# Patient Record
Sex: MALE | Race: WHITE | NOT HISPANIC OR LATINO
[De-identification: names, ages, dates, MRNs, and addresses within clinical notes are randomized per-mention and may not be internally consistent; named-entity substitution may affect disease eponyms.]

---

## 2017-07-04 ENCOUNTER — TRANSCRIPTION ENCOUNTER (OUTPATIENT)
Age: 62
End: 2017-07-04

## 2018-03-18 ENCOUNTER — TRANSCRIPTION ENCOUNTER (OUTPATIENT)
Age: 63
End: 2018-03-18

## 2020-03-02 ENCOUNTER — TRANSCRIPTION ENCOUNTER (OUTPATIENT)
Age: 65
End: 2020-03-02

## 2021-09-15 PROBLEM — Z00.00 ENCOUNTER FOR PREVENTIVE HEALTH EXAMINATION: Status: ACTIVE | Noted: 2021-09-15

## 2021-10-04 ENCOUNTER — NON-APPOINTMENT (OUTPATIENT)
Age: 66
End: 2021-10-04

## 2021-10-04 ENCOUNTER — APPOINTMENT (OUTPATIENT)
Dept: CARDIOLOGY | Facility: CLINIC | Age: 66
End: 2021-10-04
Payer: COMMERCIAL

## 2021-10-04 VITALS — SYSTOLIC BLOOD PRESSURE: 118 MMHG | DIASTOLIC BLOOD PRESSURE: 68 MMHG

## 2021-10-04 VITALS
TEMPERATURE: 98.8 F | HEART RATE: 53 BPM | DIASTOLIC BLOOD PRESSURE: 66 MMHG | WEIGHT: 255 LBS | RESPIRATION RATE: 16 BRPM | BODY MASS INDEX: 40.02 KG/M2 | OXYGEN SATURATION: 98 % | SYSTOLIC BLOOD PRESSURE: 128 MMHG | HEIGHT: 67 IN

## 2021-10-04 DIAGNOSIS — G47.33 OBSTRUCTIVE SLEEP APNEA (ADULT) (PEDIATRIC): ICD-10-CM

## 2021-10-04 DIAGNOSIS — M51.26 OTHER INTERVERTEBRAL DISC DISPLACEMENT, LUMBAR REGION: ICD-10-CM

## 2021-10-04 DIAGNOSIS — K25.9 GASTRIC ULCER, UNSPECIFIED AS ACUTE OR CHRONIC, W/OUT HEMORRHAGE OR PERFORATION: ICD-10-CM

## 2021-10-04 DIAGNOSIS — Z82.49 FAMILY HISTORY OF ISCHEMIC HEART DISEASE AND OTHER DISEASES OF THE CIRCULATORY SYSTEM: ICD-10-CM

## 2021-10-04 DIAGNOSIS — I24.8 OTHER FORMS OF ACUTE ISCHEMIC HEART DISEASE: ICD-10-CM

## 2021-10-04 DIAGNOSIS — M19.90 UNSPECIFIED OSTEOARTHRITIS, UNSPECIFIED SITE: ICD-10-CM

## 2021-10-04 DIAGNOSIS — I10 ESSENTIAL (PRIMARY) HYPERTENSION: ICD-10-CM

## 2021-10-04 DIAGNOSIS — Z83.438 FAMILY HISTORY OF OTHER DISORDER OF LIPOPROTEIN METABOLISM AND OTHER LIPIDEMIA: ICD-10-CM

## 2021-10-04 DIAGNOSIS — I25.10 ATHEROSCLEROTIC HEART DISEASE OF NATIVE CORONARY ARTERY W/OUT ANGINA PECTORIS: ICD-10-CM

## 2021-10-04 DIAGNOSIS — Z78.9 OTHER SPECIFIED HEALTH STATUS: ICD-10-CM

## 2021-10-04 DIAGNOSIS — E78.5 HYPERLIPIDEMIA, UNSPECIFIED: ICD-10-CM

## 2021-10-04 DIAGNOSIS — Z86.2 PERSONAL HISTORY OF DISEASES OF THE BLOOD AND BLOOD-FORMING ORGANS AND CERTAIN DISORDERS INVOLVING THE IMMUNE MECHANISM: ICD-10-CM

## 2021-10-04 DIAGNOSIS — E66.9 OBESITY, UNSPECIFIED: ICD-10-CM

## 2021-10-04 PROCEDURE — 93000 ELECTROCARDIOGRAM COMPLETE: CPT

## 2021-10-04 PROCEDURE — 99204 OFFICE O/P NEW MOD 45 MIN: CPT

## 2021-10-04 RX ORDER — ROSUVASTATIN CALCIUM 10 MG/1
10 TABLET, FILM COATED ORAL DAILY
Qty: 90 | Refills: 3 | Status: ACTIVE | COMMUNITY
Start: 2021-08-11

## 2021-10-04 RX ORDER — AMOXICILLIN 500 MG/1
500 CAPSULE ORAL
Qty: 16 | Refills: 0 | Status: DISCONTINUED | COMMUNITY
Start: 2021-06-21

## 2021-10-04 RX ORDER — DULOXETINE HYDROCHLORIDE 60 MG/1
60 CAPSULE, DELAYED RELEASE PELLETS ORAL
Qty: 90 | Refills: 3 | Status: ACTIVE | COMMUNITY
Start: 2021-05-20

## 2021-10-04 RX ORDER — HYDROCORTISONE 25 MG/G
2.5 CREAM TOPICAL
Qty: 85 | Refills: 0 | Status: ACTIVE | COMMUNITY
Start: 2021-09-25

## 2021-10-04 RX ORDER — MORPHINE SULFATE 10 MG/1
10 CAPSULE, EXTENDED RELEASE ORAL
Qty: 30 | Refills: 0 | Status: ACTIVE | COMMUNITY
Start: 2021-09-27

## 2021-10-04 RX ORDER — ACETIC ACID 20 MG/ML
2 SOLUTION AURICULAR (OTIC)
Qty: 15 | Refills: 0 | Status: ACTIVE | COMMUNITY
Start: 2021-09-03

## 2021-10-04 RX ORDER — KETOCONAZOLE 20 MG/G
2 CREAM TOPICAL
Qty: 90 | Refills: 0 | Status: ACTIVE | COMMUNITY
Start: 2021-09-25

## 2021-10-04 RX ORDER — GABAPENTIN 600 MG/1
600 TABLET, FILM COATED ORAL DAILY
Refills: 0 | Status: ACTIVE | COMMUNITY
Start: 2021-04-22

## 2021-10-04 RX ORDER — AMLODIPINE BESYLATE 10 MG/1
10 TABLET ORAL
Qty: 90 | Refills: 1 | Status: ACTIVE | COMMUNITY
Start: 2021-04-13

## 2021-10-04 RX ORDER — DOXYCYCLINE HYCLATE 100 MG/1
100 TABLET ORAL
Qty: 42 | Refills: 0 | Status: DISCONTINUED | COMMUNITY
Start: 2021-07-30

## 2021-10-04 RX ORDER — TIZANIDINE 4 MG/1
4 TABLET ORAL 3 TIMES DAILY
Refills: 0 | Status: ACTIVE | COMMUNITY
Start: 2021-08-07

## 2021-10-04 RX ORDER — TAMSULOSIN HYDROCHLORIDE 0.4 MG/1
0.4 CAPSULE ORAL
Qty: 90 | Refills: 0 | Status: ACTIVE | COMMUNITY
Start: 2021-05-19

## 2021-10-04 RX ORDER — MORPHINE SULFATE 15 MG/1
15 TABLET, FILM COATED, EXTENDED RELEASE ORAL
Qty: 30 | Refills: 0 | Status: DISCONTINUED | COMMUNITY
Start: 2021-04-29

## 2021-10-04 RX ORDER — CHLORHEXIDINE GLUCONATE 4 %
5 LIQUID (ML) TOPICAL
Refills: 0 | Status: ACTIVE | COMMUNITY

## 2021-10-04 RX ORDER — AZELASTINE HYDROCHLORIDE 0.5 MG/ML
0.05 SOLUTION/ DROPS OPHTHALMIC
Qty: 12 | Refills: 0 | Status: ACTIVE | COMMUNITY
Start: 2021-04-11

## 2021-10-04 RX ORDER — PANTOPRAZOLE 40 MG/1
40 TABLET, DELAYED RELEASE ORAL
Qty: 90 | Refills: 0 | Status: ACTIVE | COMMUNITY
Start: 2021-09-16

## 2021-10-04 RX ORDER — DOXYCYCLINE HYCLATE 100 MG/1
100 CAPSULE ORAL
Qty: 16 | Refills: 0 | Status: DISCONTINUED | COMMUNITY
Start: 2021-05-25

## 2021-10-04 RX ORDER — FINASTERIDE 5 MG/1
5 TABLET, FILM COATED ORAL
Qty: 90 | Refills: 3 | Status: ACTIVE | COMMUNITY
Start: 2021-04-27

## 2021-10-04 RX ORDER — LOSARTAN POTASSIUM AND HYDROCHLOROTHIAZIDE 25; 100 MG/1; MG/1
100-25 TABLET ORAL
Qty: 90 | Refills: 3 | Status: ACTIVE | COMMUNITY
Start: 2021-04-11

## 2021-10-04 RX ORDER — HALOBETASOL PROPIONATE 0.5 MG/G
0.05 CREAM TOPICAL
Qty: 50 | Refills: 0 | Status: DISCONTINUED | COMMUNITY
Start: 2021-05-17

## 2021-10-04 RX ORDER — ATENOLOL 25 MG/1
25 TABLET ORAL DAILY
Qty: 90 | Refills: 3 | Status: ACTIVE | COMMUNITY
Start: 2021-08-11

## 2021-10-04 RX ORDER — CLOPIDOGREL BISULFATE 75 MG/1
75 TABLET, FILM COATED ORAL
Qty: 90 | Refills: 1 | Status: ACTIVE | COMMUNITY
Start: 2021-09-16

## 2021-10-04 RX ORDER — VITAMIN B COMPLEX
CAPSULE ORAL
Refills: 0 | Status: ACTIVE | COMMUNITY

## 2021-10-04 RX ORDER — TRIAMCINOLONE ACETONIDE 1 MG/G
0.1 CREAM TOPICAL
Qty: 90 | Refills: 0 | Status: DISCONTINUED | COMMUNITY
Start: 2021-05-25

## 2021-10-04 NOTE — PHYSICAL EXAM

## 2021-10-04 NOTE — REASON FOR VISIT
[FreeTextEntry1] : Prosper is a pleasant 66-year-old male with history of hypertension, obesity, lumbar disc herniation, osteoarthritis of both knees, hypercholesteremia\par \par He was at [facility] on 8/27/2021 with worsening chest pain, anemia, status post 1 unit PRBC, EGD showing antrum ulcer.  EKG had ST depression with elevated troponin.  Subsequently, he had echocardiogram that showed normal EF but segmental wall motion abnormality in the inferior region and CTA of coronaries that showed 70% stenosis of circumflex and RCA.\par \par He was recommended cardiac cath and he is here for second opinion\par \katherine Currently takes Plavix.  Aspirin was discontinued due to antral ulcer.  He is on PPI.  Repeat endoscopy is scheduled for November.  At this time, he does not have melena or GI bleed.  He is able to ambulate and do routine activities without chest pain or shortness of breath. His  Activities are mainly limited by backache and knee pains.\par \par We will schedule cardiac cath after EGD ASAP.  Patient and wife understand.  We had extensive discussion regarding risk and benefits\par \par Thank you for this referral and allowing me to participate in the care of this patient.  If I can be of any further help or  if you have any questions, please do not hesitate to contact me\par \par \par Sincerely,\par \par Lalito Shelby MD, FAC, NAYA

## 2021-10-04 NOTE — DISCUSSION/SUMMARY
[FreeTextEntry1] : CAD: Patient has 70% stenosis of circumflex and RCA.  He presented to [facility] with non-STEMI.  Perhaps, associated significant anemia contributed to his chest discomfort and ST changes.  Currently, he is able to do routine activities (hemo-globin has improved to greater than 11, he is on beta-blockers with well-controlled blood pressure).  If he has any recurrence of chest discomfort symptoms, he should call 911 and go to the ER stat.  He will probably need PCI ASAP if that happened.\par \par Gastric ulcer.  He had GI bleed with anemia.  He had EGD.  PPI were prescribed.  Follow-up EGD has been scheduled in November.  I have asked the patient to take iron supplements.  His iron saturation was 16% with low iron levels.  I have asked him to see if GI could perform endoscopy as early as possible  to facilitate earlier cardiac cath and PCI which will require dual antiplatelet treatment.  He understands the risk associated with that approach\par \par Hypertension: Continue current medications.  Blood pressure well controlled.  Increase beta-blockade if needed.  Currently very well beta blocked by heart rate\par \par Obesity: Dietary changes to reduce weight was discussed.  Patient understands this.  It will also help blood pressure as well as lipids.\par \par Hyperlipidemia: patient already on statins.  Target  LDL discussed.  Statin therapy dose may have to be increased.\par \par Thank you for this referral and allowing me to participate in the care of this patient.  If I can be of any further help or  if you have any questions, please do not hesitate to contact me\par \par \par Sincerely,\par \par Lalito Shelby MD, FACC, NAYA\par \par

## 2021-10-04 NOTE — REVIEW OF SYSTEMS
[Joint Pain] : joint pain [Negative] : Heme/Lymph [FreeTextEntry5] : Hx of chest pain [FreeTextEntry9] : Backache

## 2021-10-27 ENCOUNTER — OUTPATIENT (OUTPATIENT)
Dept: OUTPATIENT SERVICES | Facility: HOSPITAL | Age: 66
LOS: 1 days | End: 2021-10-27

## 2022-05-06 ENCOUNTER — APPOINTMENT (OUTPATIENT)
Dept: ORTHOPEDIC SURGERY | Facility: CLINIC | Age: 67
End: 2022-05-06
Payer: COMMERCIAL

## 2022-05-06 VITALS — BODY MASS INDEX: 40.81 KG/M2 | HEIGHT: 67 IN | WEIGHT: 260 LBS

## 2022-05-06 DIAGNOSIS — M67.40 GANGLION, UNSPECIFIED SITE: ICD-10-CM

## 2022-05-06 PROCEDURE — 99203 OFFICE O/P NEW LOW 30 MIN: CPT

## 2022-05-06 NOTE — ASSESSMENT
[FreeTextEntry1] : Left wrist volar ganglion - reviewed pathoanatomy with patient. Discussed management will consist of observation, bracing prn, aspiration not option in light of proximity of radial artery and operative excision. Would pursue MRI if he wanted to proceed with excision.\par \par F/u prn

## 2022-05-06 NOTE — IMAGING
[de-identified] : LEFT HAND EXAM\par \par 2-3cm volar ganglion, firm, tender, not pulsatile\par Skin intact\par No deformity, edema, ecchymosis\par Warm and well perfused\par Brisk capillary refill throughout\par Motor function intact AIN, PIN, and ulnar nerves\par Sensation intact to light touch in median, ulnar, and radial nerves\par Strength with , finger abduction, wrist flexion, wrist extension 5/5\par Finger and wrist motion is intact and full\par Patient is able to make a composite fist\par

## 2022-05-06 NOTE — HISTORY OF PRESENT ILLNESS
[de-identified] : 66M, PHMX of heart disease,\par \par Growth in left wrist. 5 months with lump. States it recently started growing each month. Admits to arthritis.  Admits to experiencing pain, no numbness/tingling. States having b/l CTR and Cubital tunnel in 12/2019 3/2020. States to not having lump operated on. Dr. Kang (Dermatologist) recommended to f/u with orthopedics. Denies any intervention.

## 2023-11-20 ENCOUNTER — APPOINTMENT (OUTPATIENT)
Dept: ORTHOPEDIC SURGERY | Facility: CLINIC | Age: 68
End: 2023-11-20

## 2023-11-20 DIAGNOSIS — I25.2 OLD MYOCARDIAL INFARCTION: ICD-10-CM

## 2024-06-12 ENCOUNTER — APPOINTMENT (OUTPATIENT)
Dept: VASCULAR SURGERY | Facility: CLINIC | Age: 69
End: 2024-06-12
Payer: COMMERCIAL

## 2024-06-12 VITALS
HEIGHT: 67 IN | BODY MASS INDEX: 40.02 KG/M2 | DIASTOLIC BLOOD PRESSURE: 62 MMHG | WEIGHT: 255 LBS | SYSTOLIC BLOOD PRESSURE: 142 MMHG

## 2024-06-12 DIAGNOSIS — I87.2 VENOUS INSUFFICIENCY (CHRONIC) (PERIPHERAL): ICD-10-CM

## 2024-06-12 DIAGNOSIS — G47.30 SLEEP APNEA, UNSPECIFIED: ICD-10-CM

## 2024-06-12 DIAGNOSIS — F41.9 ANXIETY DISORDER, UNSPECIFIED: ICD-10-CM

## 2024-06-12 DIAGNOSIS — Z85.828 PERSONAL HISTORY OF OTHER MALIGNANT NEOPLASM OF SKIN: ICD-10-CM

## 2024-06-12 DIAGNOSIS — Z80.8 FAMILY HISTORY OF MALIGNANT NEOPLASM OF OTHER ORGANS OR SYSTEMS: ICD-10-CM

## 2024-06-12 DIAGNOSIS — N40.0 BENIGN PROSTATIC HYPERPLASIA WITHOUT LOWER URINARY TRACT SYMPMS: ICD-10-CM

## 2024-06-12 DIAGNOSIS — R60.0 LOCALIZED EDEMA: ICD-10-CM

## 2024-06-12 PROCEDURE — 99203 OFFICE O/P NEW LOW 30 MIN: CPT

## 2024-06-12 NOTE — PHYSICAL EXAM
[JVD] : no jugular venous distention  [Normal Breath Sounds] : Normal breath sounds [Normal Heart Sounds] : normal heart sounds [2+] : right 2+ [Ankle Swelling (On Exam)] : present [Ankle Swelling Bilaterally] : bilaterally  [Ankle Swelling On The Left] : moderate [Varicose Veins Of Lower Extremities] : not present [] : of the left leg [Abdomen Masses] : No abdominal masses [Ankle Swelling On The Right] : mild [Abdomen Tenderness] : ~T ~M No abdominal tenderness [Skin Induration] : no induration [Skin Ulcer] : no ulcer [Calm] : calm [de-identified] : Markedly overweight, no acute distress

## 2024-06-12 NOTE — HISTORY OF PRESENT ILLNESS
[FreeTextEntry1] : 69-year-old male is being referred by Dr. Deras to be evaluated for bilateral leg edema, left leg discoloration.  Symptoms started a few years ago.  Patient has not been wearing compression stockings.

## 2024-06-12 NOTE — REVIEW OF SYSTEMS
[Leg Claudication] : no intermittent leg claudication [Lower Ext Edema] : lower extremity edema [Arthralgias] : arthralgias [Limb Pain] : limb pain [Limb Swelling] : limb swelling [Negative] : Integumentary

## 2024-07-03 ENCOUNTER — APPOINTMENT (OUTPATIENT)
Dept: VASCULAR SURGERY | Facility: CLINIC | Age: 69
End: 2024-07-03
Payer: COMMERCIAL

## 2024-07-03 VITALS
DIASTOLIC BLOOD PRESSURE: 74 MMHG | SYSTOLIC BLOOD PRESSURE: 162 MMHG | HEIGHT: 67 IN | WEIGHT: 255 LBS | BODY MASS INDEX: 40.02 KG/M2

## 2024-07-03 DIAGNOSIS — Z85.828 PERSONAL HISTORY OF OTHER MALIGNANT NEOPLASM OF SKIN: ICD-10-CM

## 2024-07-03 DIAGNOSIS — Z80.8 FAMILY HISTORY OF MALIGNANT NEOPLASM OF OTHER ORGANS OR SYSTEMS: ICD-10-CM

## 2024-07-03 DIAGNOSIS — F41.9 ANXIETY DISORDER, UNSPECIFIED: ICD-10-CM

## 2024-07-03 DIAGNOSIS — N40.0 BENIGN PROSTATIC HYPERPLASIA WITHOUT LOWER URINARY TRACT SYMPMS: ICD-10-CM

## 2024-07-03 PROCEDURE — 99214 OFFICE O/P EST MOD 30 MIN: CPT

## 2024-07-08 PROBLEM — Z85.828 HISTORY OF SQUAMOUS CELL CARCINOMA OF SKIN: Status: RESOLVED | Noted: 2024-06-12 | Resolved: 2024-07-08

## 2024-07-08 PROBLEM — N40.0 ENLARGED PROSTATE: Status: RESOLVED | Noted: 2024-06-12 | Resolved: 2024-07-08

## 2024-07-09 ENCOUNTER — APPOINTMENT (OUTPATIENT)
Dept: VASCULAR SURGERY | Facility: CLINIC | Age: 69
End: 2024-07-09
Payer: COMMERCIAL

## 2024-07-09 DIAGNOSIS — G47.30 SLEEP APNEA, UNSPECIFIED: ICD-10-CM

## 2024-07-09 DIAGNOSIS — Z99.89 DEPENDENCE ON OTHER ENABLING MACHINES AND DEVICES: ICD-10-CM

## 2024-07-09 PROCEDURE — 93970 EXTREMITY STUDY: CPT

## 2024-07-10 ENCOUNTER — APPOINTMENT (OUTPATIENT)
Dept: VASCULAR SURGERY | Facility: CLINIC | Age: 69
End: 2024-07-10
Payer: COMMERCIAL

## 2024-07-10 VITALS
WEIGHT: 255 LBS | DIASTOLIC BLOOD PRESSURE: 60 MMHG | HEIGHT: 67 IN | BODY MASS INDEX: 40.02 KG/M2 | SYSTOLIC BLOOD PRESSURE: 154 MMHG

## 2024-07-10 DIAGNOSIS — I87.2 VENOUS INSUFFICIENCY (CHRONIC) (PERIPHERAL): ICD-10-CM

## 2024-07-10 PROCEDURE — 99213 OFFICE O/P EST LOW 20 MIN: CPT

## 2024-07-12 ENCOUNTER — APPOINTMENT (OUTPATIENT)
Dept: CARDIOLOGY | Facility: CLINIC | Age: 69
End: 2024-07-12
Payer: COMMERCIAL

## 2024-07-12 VITALS
OXYGEN SATURATION: 97 % | HEIGHT: 67 IN | BODY MASS INDEX: 41.59 KG/M2 | WEIGHT: 265 LBS | DIASTOLIC BLOOD PRESSURE: 52 MMHG | RESPIRATION RATE: 16 BRPM | SYSTOLIC BLOOD PRESSURE: 120 MMHG | HEART RATE: 45 BPM

## 2024-07-12 VITALS — DIASTOLIC BLOOD PRESSURE: 56 MMHG | SYSTOLIC BLOOD PRESSURE: 116 MMHG

## 2024-07-12 DIAGNOSIS — G47.33 OBSTRUCTIVE SLEEP APNEA (ADULT) (PEDIATRIC): ICD-10-CM

## 2024-07-12 DIAGNOSIS — I44.7 LEFT BUNDLE-BRANCH BLOCK, UNSPECIFIED: ICD-10-CM

## 2024-07-12 DIAGNOSIS — R07.89 OTHER CHEST PAIN: ICD-10-CM

## 2024-07-12 DIAGNOSIS — R60.0 LOCALIZED EDEMA: ICD-10-CM

## 2024-07-12 DIAGNOSIS — I10 ESSENTIAL (PRIMARY) HYPERTENSION: ICD-10-CM

## 2024-07-12 DIAGNOSIS — E78.5 HYPERLIPIDEMIA, UNSPECIFIED: ICD-10-CM

## 2024-07-12 DIAGNOSIS — E66.9 OBESITY, UNSPECIFIED: ICD-10-CM

## 2024-07-12 DIAGNOSIS — I25.10 ATHEROSCLEROTIC HEART DISEASE OF NATIVE CORONARY ARTERY W/OUT ANGINA PECTORIS: ICD-10-CM

## 2024-07-12 DIAGNOSIS — Z78.9 OTHER SPECIFIED HEALTH STATUS: ICD-10-CM

## 2024-07-12 PROCEDURE — G2211 COMPLEX E/M VISIT ADD ON: CPT | Mod: NC

## 2024-07-12 PROCEDURE — 99214 OFFICE O/P EST MOD 30 MIN: CPT

## 2024-07-12 PROCEDURE — 93000 ELECTROCARDIOGRAM COMPLETE: CPT

## 2024-07-15 PROBLEM — Z99.89 CPAP (CONTINUOUS POSITIVE AIRWAY PRESSURE) DEPENDENCE: Status: ACTIVE | Noted: 2024-07-15

## 2024-07-15 PROBLEM — G47.30 SEVERE SLEEP APNEA: Status: ACTIVE | Noted: 2024-07-15

## 2024-07-15 RX ORDER — NITROGLYCERIN 0.4 MG/1
0.4 TABLET SUBLINGUAL
Refills: 0 | Status: ACTIVE | COMMUNITY

## 2024-07-15 RX ORDER — OMEGA-3/DHA/EPA/FISH OIL 300-1000MG
400 CAPSULE ORAL
Refills: 0 | Status: ACTIVE | COMMUNITY

## 2024-07-15 RX ORDER — ACETAMINOPHEN 500 MG/1
500 TABLET ORAL
Refills: 0 | Status: ACTIVE | COMMUNITY

## 2024-07-15 RX ORDER — HYDROCODONE BITARTRATE AND ACETAMINOPHEN 5; 325 MG/1; MG/1
5-325 TABLET ORAL
Refills: 0 | Status: ACTIVE | COMMUNITY
Start: 2024-06-26

## 2024-07-15 RX ORDER — GABAPENTIN 600 MG/1
600 TABLET, COATED ORAL TWICE DAILY
Refills: 0 | Status: ACTIVE | COMMUNITY

## 2024-07-15 RX ORDER — GABAPENTIN 600 MG/1
600 TABLET, FILM COATED ORAL
Refills: 0 | Status: ACTIVE | COMMUNITY

## 2024-07-16 RX ORDER — POTASSIUM CHLORIDE 600 MG/1
8 TABLET, FILM COATED, EXTENDED RELEASE ORAL DAILY
Qty: 90 | Refills: 3 | Status: ACTIVE | COMMUNITY
Start: 1900-01-01 | End: 1900-01-01

## 2024-07-19 RX ORDER — AMLODIPINE BESYLATE 5 MG/1
5 TABLET ORAL DAILY
Refills: 0 | Status: ACTIVE | COMMUNITY

## 2024-08-02 ENCOUNTER — APPOINTMENT (OUTPATIENT)
Dept: CARDIOLOGY | Facility: CLINIC | Age: 69
End: 2024-08-02
Payer: COMMERCIAL

## 2024-08-02 VITALS
HEIGHT: 67 IN | RESPIRATION RATE: 14 BRPM | DIASTOLIC BLOOD PRESSURE: 60 MMHG | BODY MASS INDEX: 40.65 KG/M2 | OXYGEN SATURATION: 97 % | SYSTOLIC BLOOD PRESSURE: 144 MMHG | HEART RATE: 52 BPM | WEIGHT: 259 LBS

## 2024-08-02 DIAGNOSIS — I44.7 LEFT BUNDLE-BRANCH BLOCK, UNSPECIFIED: ICD-10-CM

## 2024-08-02 DIAGNOSIS — Z99.89 DEPENDENCE ON OTHER ENABLING MACHINES AND DEVICES: ICD-10-CM

## 2024-08-02 DIAGNOSIS — I10 ESSENTIAL (PRIMARY) HYPERTENSION: ICD-10-CM

## 2024-08-02 DIAGNOSIS — I25.10 ATHEROSCLEROTIC HEART DISEASE OF NATIVE CORONARY ARTERY W/OUT ANGINA PECTORIS: ICD-10-CM

## 2024-08-02 DIAGNOSIS — E66.9 OBESITY, UNSPECIFIED: ICD-10-CM

## 2024-08-02 DIAGNOSIS — R60.0 LOCALIZED EDEMA: ICD-10-CM

## 2024-08-02 DIAGNOSIS — E78.5 HYPERLIPIDEMIA, UNSPECIFIED: ICD-10-CM

## 2024-08-02 DIAGNOSIS — R07.89 OTHER CHEST PAIN: ICD-10-CM

## 2024-08-02 PROCEDURE — 99214 OFFICE O/P EST MOD 30 MIN: CPT

## 2024-08-02 PROCEDURE — 93306 TTE W/DOPPLER COMPLETE: CPT

## 2024-08-02 PROCEDURE — G2211 COMPLEX E/M VISIT ADD ON: CPT | Mod: NC

## 2024-08-02 RX ORDER — TELMISARTAN AND HYDROCHLOROTHIAZIDE 80; 25 MG/1; MG/1
80-25 TABLET ORAL DAILY
Qty: 90 | Refills: 0 | Status: ACTIVE | COMMUNITY
Start: 2024-08-02 | End: 1900-01-01

## 2024-08-02 NOTE — CARDIOLOGY SUMMARY
[de-identified] : August 2021 NSR with 1st degree AV block. LAFB. October 2021 SB with 1st degree AV block. LAFB.  [de-identified] : Holter Monitoring 7/12/24-7/16/24 Sinus Rhythm, symptomatic with rates 56-70bpm. 1.2% atrial ectopy. 2 SVT episodes, 4 beats at 150bpm, 5 beats at 156bpm.  [de-identified] : Echo 8/2024 LVEF 65%, moderate MR, mild TR, mild LVH, PASP 35mmHg. Grade 2 diastolic dysfunction.  Echo 8/2021 LVEF 50%. +WMA (basal inferior wall hypokinesis, mid inferior wall hypokinesis, mild diastolic dysfunction. LAE. Mild-Mod MR.  [de-identified] : 9/7/2021 CTA Calcium score 566- Left Main 0, , Ramus intermedius 10, LCx 222, RCA 12. Suspected luminal stenoses of greater than 70% in the left circumflex and right coronary arteries.

## 2024-08-02 NOTE — PHYSICAL EXAM
[Normal S1, S2] : normal S1, S2 [Normal] : clear lung fields, good air entry, no respiratory distress [Moves all extremities] : moves all extremities [Normal Speech] : normal speech [Alert and Oriented] : alert and oriented [de-identified] : +1 pitting edema bilateral lower extremity.

## 2024-08-02 NOTE — CARDIOLOGY SUMMARY
[de-identified] : August 2021 NSR with 1st degree AV block. LAFB. October 2021 SB with 1st degree AV block. LAFB.  [de-identified] : Holter Monitoring 7/12/24-7/16/24 Sinus Rhythm, symptomatic with rates 56-70bpm. 1.2% atrial ectopy. 2 SVT episodes, 4 beats at 150bpm, 5 beats at 156bpm.  [de-identified] : Echo 8/2024 LVEF 65%, moderate MR, mild TR, mild LVH, PASP 35mmHg. Grade 2 diastolic dysfunction.  Echo 8/2021 LVEF 50%. +WMA (basal inferior wall hypokinesis, mid inferior wall hypokinesis, mild diastolic dysfunction. LAE. Mild-Mod MR.  [de-identified] : 9/7/2021 CTA Calcium score 566- Left Main 0, , Ramus intermedius 10, LCx 222, RCA 12. Suspected luminal stenoses of greater than 70% in the left circumflex and right coronary arteries.

## 2024-08-02 NOTE — DISCUSSION/SUMMARY
[FreeTextEntry1] : SHELBY CARMEN is a 69 year old M who presents today Aug 02, 2024 with the above history and the following active issues:   Echocardiogram today was reviewed with the patient and wife Gladys in detail as reported in HPI. Stable findings. No cardiac source of lower extremity edema.   Atypical chest pain:  -Pt will have CTA Heart Coronaries preformed on 8/12/24 and f/u OV.  -Red flag symptoms which would warrant sooner emergent evaluation reviewed with the patient. -Continue Plavix, ASA 81mg, Rosuvastatin 20mg.   Hypertension: BPs remain elevated today. 144/64  Medications were adjusted, lower dose of Amlodipine (5mg) and increased dose of Atenolol (25mg daily).  -Recommended discontinuing Losartan-HCTZ. Start Telmisartan-HCTZ 80-25.  -F/u Chemistry profile in 1 week. Lab slip provided to the patient. He will call for results. -Keep home BP log, contact the office for persistently elevated BPs >150/>90.   HLD: Lipids are well controlled on Rosuvastatin 20mg. Last LDL 64 in June 2024.   Hypokalemia: started on 8meq daily of potassium supplementation. He is also on magnesium supplementation.   Lower Extremity Edema: Ongoing f/u with Dr. Sanchez for lower extremity swelling. Likely secondary to venous insufficiency. No cardiac etiology noted on Echocardiogram today.   Prediabetic: A1C 6.1%. Ongoing f/u with PCP.   He is concerned regarding carotid arteries. He has known CAD with PCI, HLD and prediabetes. He is a never smoker but had significant exposure to secondhand smoke from birth to age 17. Significant risk factors. -Recommend Carotid Ultrasound to evaluate for evidence of significant carotid atherosclerosis or obstruction. -Patient educated and advised to follow active lifestyle with regular cardiovascular exercise, as well as diet modification with low sodium, low fat, and avoidance of excessive alcohol.  Limitations of non-invasive testing reviewed. Red flag symptoms which would warrant sooner emergent evaluation reviewed with the patient. All questions and concerns were addressed and answered.   Sincerely,   Gillian Bellamy, [facility]-BC Patient's history, testing, and plan reviewed with supervising MD: Dr. Lalito Shelby

## 2024-08-02 NOTE — HISTORY OF PRESENT ILLNESS
[FreeTextEntry1] : Prosper is a pleasant 69yoM with PMHx of HTN, HLD, CAD s/p IRMA to mLCx (November 2021), prediabetic, and b/l lower extremity edema.   Prosper presents in clinic today for echo and f/u OV to review results.  Echo today shows: LVEF 65%, moderate MR, mild TR, mild LVH, PASP 35mmHg. Grade 2 diastolic dysfunction.   He continues to experience intermittent upper retrosternal chest discomfort described as pressure or aching. Pain does not radiate and there are no associated sx.  There is no significant dyspnea on exertion or orthopnea. There are no symptomatic palpitations, lightheadedness, dizziness or syncope. CTA Heart Coronaries is scheduled for 8/12/24.   Labs were provided for my review: 6/4/24  LDL 64, Trigs 123, HDL 41, K 3.6, Na 141, Cr 1.19, eGFR 67, Normal LFTs, A1C 6.1%, Normal CBC.

## 2024-08-02 NOTE — DISCUSSION/SUMMARY
[FreeTextEntry1] : SHELBY CARMEN is a 69 year old M who presents today Aug 02, 2024 with the above history and the following active issues:   Echocardiogram today was reviewed with the patient and wife Gladys in detail as reported in HPI. Stable findings. No cardiac source of lower extremity edema.   Atypical chest pain:  -Pt will have CTA Heart Coronaries preformed on 8/12/24 and f/u OV.  -Red flag symptoms which would warrant sooner emergent evaluation reviewed with the patient. -Continue Plavix, ASA 81mg, Rosuvastatin 20mg.   Hypertension: BPs remain elevated today. 144/64  Medications were adjusted, lower dose of Amlodipine (5mg) and increased dose of Atenolol (25mg daily).  -Recommended discontinuing Losartan-HCTZ. Start Telmisartan-HCTZ 80-25.  -F/u Chemistry profile in 1 week. Lab slip provided to the patient. He will call for results. -Keep home BP log, contact the office for persistently elevated BPs >150/>90.   HLD: Lipids are well controlled on Rosuvastatin 20mg. Last LDL 64 in June 2024.   Hypokalemia: started on 8meq daily of potassium supplementation. He is also on magnesium supplementation.   Lower Extremity Edema: Ongoing f/u with Dr. Sanchez for lower extremity swelling. Likely secondary to venous insufficiency. No cardiac etiology noted on Echocardiogram today.   Prediabetic: A1C 6.1%. Ongoing f/u with PCP.   He is concerned regarding carotid arteries. He has known CAD with PCI, HLD and prediabetes. He is a never smoker but had significant exposure to secondhand smoke from birth to age 17. Significant risk factors. -Recommend Carotid Ultrasound to evaluate for evidence of significant carotid atherosclerosis or obstruction. -Patient educated and advised to follow active lifestyle with regular cardiovascular exercise, as well as diet modification with low sodium, low fat, and avoidance of excessive alcohol.  Limitations of non-invasive testing reviewed. Red flag symptoms which would warrant sooner emergent evaluation reviewed with the patient. All questions and concerns were addressed and answered.   Sincerely,   Gillian Bellamy, [facility]-BC Patient's history, testing, and plan reviewed with supervising MD: Dr. Lalito Shelby

## 2024-08-02 NOTE — PHYSICAL EXAM
[Normal S1, S2] : normal S1, S2 [Normal] : clear lung fields, good air entry, no respiratory distress [Moves all extremities] : moves all extremities [Normal Speech] : normal speech [Alert and Oriented] : alert and oriented [de-identified] : +1 pitting edema bilateral lower extremity.

## 2024-08-03 ENCOUNTER — RX CHANGE (OUTPATIENT)
Age: 69
End: 2024-08-03

## 2024-08-19 ENCOUNTER — NON-APPOINTMENT (OUTPATIENT)
Age: 69
End: 2024-08-19

## 2024-08-21 LAB
ANION GAP SERPL CALC-SCNC: 14 MMOL/L
BASOPHILS # BLD AUTO: 0.04 K/UL
BASOPHILS NFR BLD AUTO: 0.5 %
BUN SERPL-MCNC: 34 MG/DL
CALCIUM SERPL-MCNC: 9.5 MG/DL
CHLORIDE SERPL-SCNC: 102 MMOL/L
CO2 SERPL-SCNC: 26 MMOL/L
CREAT SERPL-MCNC: 1.28 MG/DL
EGFR: 61 ML/MIN/1.73M2
EOSINOPHIL # BLD AUTO: 0.11 K/UL
EOSINOPHIL NFR BLD AUTO: 1.3 %
GLUCOSE SERPL-MCNC: 97 MG/DL
HCT VFR BLD CALC: 43.1 %
HGB BLD-MCNC: 14.2 G/DL
IMM GRANULOCYTES NFR BLD AUTO: 0.2 %
LYMPHOCYTES # BLD AUTO: 2.11 K/UL
LYMPHOCYTES NFR BLD AUTO: 24 %
MAN DIFF?: NORMAL
MCHC RBC-ENTMCNC: 31.2 PG
MCHC RBC-ENTMCNC: 32.9 GM/DL
MCV RBC AUTO: 94.7 FL
MONOCYTES # BLD AUTO: 0.92 K/UL
MONOCYTES NFR BLD AUTO: 10.5 %
NEUTROPHILS # BLD AUTO: 5.59 K/UL
NEUTROPHILS NFR BLD AUTO: 63.5 %
PLATELET # BLD AUTO: 191 K/UL
POTASSIUM SERPL-SCNC: 4.5 MMOL/L
RBC # BLD: 4.55 M/UL
RBC # FLD: 14.2 %
SODIUM SERPL-SCNC: 143 MMOL/L
WBC # FLD AUTO: 8.79 K/UL

## 2024-08-30 ENCOUNTER — NON-APPOINTMENT (OUTPATIENT)
Age: 69
End: 2024-08-30

## 2024-08-30 ENCOUNTER — APPOINTMENT (OUTPATIENT)
Dept: CARDIOLOGY | Facility: CLINIC | Age: 69
End: 2024-08-30
Payer: COMMERCIAL

## 2024-08-30 VITALS
OXYGEN SATURATION: 95 % | SYSTOLIC BLOOD PRESSURE: 142 MMHG | HEART RATE: 60 BPM | HEIGHT: 67 IN | BODY MASS INDEX: 40.18 KG/M2 | WEIGHT: 256 LBS | DIASTOLIC BLOOD PRESSURE: 68 MMHG

## 2024-08-30 PROCEDURE — 99205 OFFICE O/P NEW HI 60 MIN: CPT

## 2024-08-30 PROCEDURE — G2211 COMPLEX E/M VISIT ADD ON: CPT | Mod: NC

## 2024-08-30 PROCEDURE — 93000 ELECTROCARDIOGRAM COMPLETE: CPT

## 2024-08-30 NOTE — DISCUSSION/SUMMARY
[FreeTextEntry1] :  69 year old gentleman with history of obstructive CAD s/p PCI of LCx (Nov 2021), HTN, HLD, obesity, and FARZAD on CPAP with questionable therapeutic value given significant daytime fatigue, overall breathlessness, and other features of FARZAD. We will get a cardiac MRI stress to identify ischemia, particularly in the LAD territory. If present, we will enthusiastically proceed with PCI. If not, we will explore other reasons for patient's non-specific symptoms. Additionally, we will obtain an attended sleep study given his symptoms seem very consistent with this diagnosis, regardless of CPAP presence.   Plan:   - Cardiac stress MRI   - Attended sleep study  - No indication for aspirin, can continue single antiplatelet therapy with clopidogrel (wanted to discuss at follow-up with Dr. Shelby coming up)   RTC: Follow-up with primary cardiologist in approx 1 week, I will re-engage pending cardiac MRI results.   Appreciate the opportunity to participate in the care of Mr. CARMEN. Strict ER precautions were provided to patient for symptoms that arise or worsen. Please do not hesitate to reach out with any questions, concerns, or changes in clinical status.   Lionel Fernandes MD, [facility]  Interventional & Clinical Cardiology  Altogether, I had the privilege of spending 50 minutes on this patient's care, more than 50% of which was during a face-to-face encounter. This does not include time required to obtain/interpret an ECG or time invested in the education of medical trainees who may have participated in the patient's care.  [EKG obtained to assist in diagnosis and management of assessed problem(s)] : EKG obtained to assist in diagnosis and management of assessed problem(s)
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
## 2024-08-30 NOTE — CARDIOLOGY SUMMARY
[de-identified] : 08/30/24: Sinus bradycardia with 1st deg AVB, LBBB,  [de-identified] : TTE 08/02/24  1. Left ventricular cavity is mildly dilated. Abnormal septal motion consistent with left bundle branch block. Left ventricular systolic function is normal with an ejection fraction of 65 % by Luevano's method of disks. 2. There is moderate (grade 2) left ventricular diastolic dysfunction. 3. Normal right ventricular cavity size, with normal wall thickness, and normal right ventricular systolic function. 4. Moderate mitral regurgitation. 5. No pericardial effusion seen. 6. Estimated pulmonary artery systolic pressure is 35 mmHg, consistent with borderline pulmonary hypertension. 7. Mild left ventricular hypertrophy. 8. Mild tricuspid regurgitation. 9. Technically difficult image quality. 10. Compared to the transthoracic echocardiogram performed on 8/27/2021, decrease in LA size, increase in MR, PASP and grade II LV diastolic dysfunction is noted.

## 2024-09-04 ENCOUNTER — APPOINTMENT (OUTPATIENT)
Dept: VASCULAR SURGERY | Facility: CLINIC | Age: 69
End: 2024-09-04
Payer: COMMERCIAL

## 2024-09-04 VITALS
HEIGHT: 67 IN | BODY MASS INDEX: 40.18 KG/M2 | DIASTOLIC BLOOD PRESSURE: 62 MMHG | SYSTOLIC BLOOD PRESSURE: 142 MMHG | WEIGHT: 256 LBS

## 2024-09-04 PROCEDURE — 99213 OFFICE O/P EST LOW 20 MIN: CPT

## 2024-09-04 NOTE — PHYSICAL EXAM
[JVD] : no jugular venous distention  [Normal Breath Sounds] : Normal breath sounds [Calm] : calm [de-identified] : Moderately overweight [de-identified] : 2 ecchymotic areas over the left pretibial area

## 2024-09-04 NOTE — REVIEW OF SYSTEMS
[Skin Lesions] : skin lesion [Negative] : Gastrointestinal [FreeTextEntry6] : History of sleep apnea

## 2024-09-04 NOTE — HISTORY OF PRESENT ILLNESS
[FreeTextEntry1] : Patient complains about the spontaneous ecchymosis which occurred on his left leg.  They are not painful.  Process started about 2 weeks ago. [] : mildly antalgic [FreeTextEntry8] : improved [FreeTextEntry9] : improving ROM [de-identified] : radicular complaints to the left knee

## 2024-09-05 ENCOUNTER — APPOINTMENT (OUTPATIENT)
Dept: CARDIOLOGY | Facility: CLINIC | Age: 69
End: 2024-09-05
Payer: COMMERCIAL

## 2024-09-05 VITALS
DIASTOLIC BLOOD PRESSURE: 64 MMHG | OXYGEN SATURATION: 96 % | WEIGHT: 250 LBS | SYSTOLIC BLOOD PRESSURE: 130 MMHG | HEIGHT: 67 IN | BODY MASS INDEX: 39.24 KG/M2 | HEART RATE: 51 BPM

## 2024-09-05 DIAGNOSIS — Z78.9 OTHER SPECIFIED HEALTH STATUS: ICD-10-CM

## 2024-09-05 DIAGNOSIS — R23.3 SPONTANEOUS ECCHYMOSES: ICD-10-CM

## 2024-09-05 DIAGNOSIS — R60.0 LOCALIZED EDEMA: ICD-10-CM

## 2024-09-05 DIAGNOSIS — R00.1 BRADYCARDIA, UNSPECIFIED: ICD-10-CM

## 2024-09-05 DIAGNOSIS — I87.2 VENOUS INSUFFICIENCY (CHRONIC) (PERIPHERAL): ICD-10-CM

## 2024-09-05 PROCEDURE — 93880 EXTRACRANIAL BILAT STUDY: CPT

## 2024-09-05 PROCEDURE — 99215 OFFICE O/P EST HI 40 MIN: CPT

## 2024-09-05 PROCEDURE — G2211 COMPLEX E/M VISIT ADD ON: CPT | Mod: NC

## 2024-09-05 NOTE — DISCUSSION/SUMMARY
[FreeTextEntry1] : SHELBY CARMEN is a 69 year old M who presents today Sep 05, 2024 with the above history and the following active issues:   CAD s/p PCI, atypical CP: see HPI.  Pt is pending cardiac MRI (Per NT if ischemia is present, particularly in the LAD territory we will proceed with PCI. If not other reasons for patients non-specific symptoms will be explored).  He will schedule f/u OV once date of testing is established. Pt should stop ASA and continue Plavix 75mg daily.   Severe FARZAD with CPAP: ongoing significant daytime fatigue, lethargy Pt is pending attended sleep study for CPAP titration.  We will obtain thyroid function tests. He has upcoming blood work with Dr. Deras.   HTN, Bradycardia: see HPI.  Recommend decreasing Atenolol to 12.5mg.  Start Cardura XL 4mg daily.  Pt and wife will maintain BP/HR log and call the office back in 2 weeks with updated readings.  They will make us aware of any persistently elevated BPs >140/>90, or bradycardic readings <50bpm.   He has lost 9lbs in the last 2 months. Further weight loss would help. Patient educated and advised to follow active lifestyle with regular cardiovascular exercise, as well as diet modification with low sodium, low fat, and avoidance of excessive alcohol.   Limitations of non-invasive testing reviewed. Red flag symptoms which would warrant sooner emergent evaluation reviewed with the patient. All questions and concerns were addressed and answered.   f/u after above testing.   Sincerely,   Gillian Bellamy, [facility] Patient's history, testing, and plan reviewed with supervising MD: Dr. Lalito Shelby

## 2024-09-05 NOTE — REVIEW OF SYSTEMS
[Feeling Fatigued] : feeling fatigued [Chest Discomfort] : chest discomfort [Lower Ext Edema] : lower extremity edema [Easy Bruising] : a tendency for easy bruising

## 2024-09-05 NOTE — CARDIOLOGY SUMMARY
[de-identified] : August 2024 SB with 1st degree AV block, LBBB.  July 2024 NSR with 1st degree AV block. LBBB.  August 2021 NSR with 1st degree AV block. LAFB. October 2021 SB with 1st degree AV block. LAFB.   [de-identified] :  Holter Monitoring 7/12/24-7/16/24 Sinus Rhythm, symptomatic with rates 56-70bpm. 1.2% atrial ectopy. 2 SVT episodes, 4 beats at 150bpm, 5 beats at 156bpm.   [de-identified] : Echo 8/2024 LVEF 65%, moderate MR, mild TR, mild LVH, PASP 35mmHg. Grade 2 diastolic dysfunction. Echo 8/2021 LVEF 50%. +WMA (basal inferior wall hypokinesis, mid inferior wall hypokinesis, mild diastolic dysfunction. LAE. Mild-Mod MR.   [de-identified] : 8/22/24 severe diffuse atherosclerotic CAD, 30% eccentric distal LM, small caliber diffusely diseased LAD without focal stenosis, 20% oLCx. pRCA  with L -> R collateral opacification appreciated. LVEDP 15mmHg.  [de-identified] : 8/15/24 CTA Calcium Score n/a due to stent. Significant coronary calcification, obstructive disease of RCA, mLAD.  9/7/2021 CTA Calcium score 566- Left Main 0, , Ramus intermedius 10, LCx 222, RCA 12. Suspected luminal stenoses of greater than 70% in the left circumflex and right coronary arteries.  [de-identified] : 9/5/24 Carotid US: mild calcified plaque of b/l bulbs. Bilateral ICAs are tortuous, antegrade flow b/l.  7/9/24 US Duplex Venous b/l LE: No acute DVT or venous insufficiency noted bilaterally.

## 2024-09-05 NOTE — PHYSICAL EXAM
[Well Developed] : well developed [No Acute Distress] : no acute distress [Obese] : obese [Normal S1, S2] : normal S1, S2 [No Murmur] : no murmur [Clear Lung Fields] : clear lung fields [Good Air Entry] : good air entry [Normal Gait] : normal gait [Normal DP B/L] : normal DP B/L [Moves all extremities] : moves all extremities [No Focal Deficits] : no focal deficits [Normal Speech] : normal speech [Alert and Oriented] : alert and oriented [Normal memory] : normal memory [de-identified] : bradycardic [de-identified] : trace b/l LE edema.  [de-identified] : easy bruising of LLE.

## 2024-09-11 RX ORDER — DOXAZOSIN 4 MG/1
4 TABLET ORAL
Qty: 90 | Refills: 0 | Status: ACTIVE | COMMUNITY
Start: 2024-09-11 | End: 1900-01-01

## 2024-09-13 ENCOUNTER — APPOINTMENT (OUTPATIENT)
Dept: CARDIOLOGY | Facility: CLINIC | Age: 69
End: 2024-09-13
Payer: COMMERCIAL

## 2024-09-13 VITALS
HEART RATE: 73 BPM | HEIGHT: 67 IN | BODY MASS INDEX: 40.02 KG/M2 | OXYGEN SATURATION: 95 % | WEIGHT: 255 LBS | DIASTOLIC BLOOD PRESSURE: 68 MMHG | SYSTOLIC BLOOD PRESSURE: 138 MMHG | RESPIRATION RATE: 14 BRPM

## 2024-09-13 DIAGNOSIS — I44.7 LEFT BUNDLE-BRANCH BLOCK, UNSPECIFIED: ICD-10-CM

## 2024-09-13 DIAGNOSIS — E78.5 HYPERLIPIDEMIA, UNSPECIFIED: ICD-10-CM

## 2024-09-13 DIAGNOSIS — Z99.89 DEPENDENCE ON OTHER ENABLING MACHINES AND DEVICES: ICD-10-CM

## 2024-09-13 DIAGNOSIS — E66.9 OBESITY, UNSPECIFIED: ICD-10-CM

## 2024-09-13 DIAGNOSIS — I31.9 DISEASE OF PERICARDIUM, UNSPECIFIED: ICD-10-CM

## 2024-09-13 DIAGNOSIS — I25.10 ATHEROSCLEROTIC HEART DISEASE OF NATIVE CORONARY ARTERY W/OUT ANGINA PECTORIS: ICD-10-CM

## 2024-09-13 DIAGNOSIS — I10 ESSENTIAL (PRIMARY) HYPERTENSION: ICD-10-CM

## 2024-09-13 DIAGNOSIS — G47.30 SLEEP APNEA, UNSPECIFIED: ICD-10-CM

## 2024-09-13 DIAGNOSIS — R07.89 OTHER CHEST PAIN: ICD-10-CM

## 2024-09-13 PROCEDURE — G2211 COMPLEX E/M VISIT ADD ON: CPT | Mod: NC

## 2024-09-13 PROCEDURE — 99215 OFFICE O/P EST HI 40 MIN: CPT

## 2024-09-13 RX ORDER — COLCHICINE 0.6 MG/1
0.6 TABLET ORAL DAILY
Qty: 30 | Refills: 0 | Status: ACTIVE | COMMUNITY
Start: 2024-09-13 | End: 1900-01-01

## 2024-09-13 RX ORDER — CYCLOBENZAPRINE HYDROCHLORIDE 10 MG/1
10 TABLET, FILM COATED ORAL
Refills: 0 | Status: ACTIVE | COMMUNITY

## 2024-09-13 RX ORDER — DOXAZOSIN MESYLATE 4 MG/1
4 TABLET, FILM COATED, EXTENDED RELEASE ORAL DAILY
Qty: 90 | Refills: 0 | Status: DISCONTINUED | COMMUNITY
Start: 2024-09-05 | End: 2024-09-13

## 2024-09-13 NOTE — DISCUSSION/SUMMARY
[FreeTextEntry1] : SHELBY CARMEN is a 69 year old M with complex cardiovascular issues.  Past history of CAD s/p PCI,  Patient has upper retrosternal chest discomfort.  Mostly with exertion. Cardiac cath reported above.   RCA, collateralized.  30% left main.  Severe diffuse disease in LAD Cardiac MRI showed surprising myopericarditis, focal, mild..  Pt  continue Plavix 75mg daily.  Aspirin was stopped Colchicine for 4 weeks.  Patient is on prednisone taper for other reasons. Cardiac PET scan for evaluation of coronary flow reserve to direct PCI.  The patient does seem to have symptoms of CAD For evaluation of myopericarditis, check ESR, CRP, Lyme titers, RA and ABDIRIZAK  Severe FARZAD with CPAP: ongoing significant daytime fatigue, lethargy Pt is pending attended sleep study for CPAP titration.   HTN, Bradycardia: see HPI.  Recommend decreasing Atenolol to 12.5mg.  on Cardura XL 4mg daily.   Weight loss is very essential.  Patient is contemplating GLP-1 agonist.   Red flag symptoms which would warrant sooner emergent evaluation reviewed with the patient. All questions and concerns were addressed and answered.   f/u after above testing.
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
## 2024-09-13 NOTE — REVIEW OF SYSTEMS
[Feeling Fatigued] : feeling fatigued [Dyspnea on exertion] : dyspnea during exertion [Chest Discomfort] : chest discomfort [Lower Ext Edema] : lower extremity edema [Easy Bruising] : a tendency for easy bruising

## 2024-09-13 NOTE — CARDIOLOGY SUMMARY
[de-identified] : August 2024 SB with 1st degree AV block, LBBB.  July 2024 NSR with 1st degree AV block. LBBB.  August 2021 NSR with 1st degree AV block. LAFB. October 2021 SB with 1st degree AV block. LAFB.   [de-identified] :  Holter Monitoring 7/12/24-7/16/24 Sinus Rhythm, symptomatic with rates 56-70bpm. 1.2% atrial ectopy. 2 SVT episodes, 4 beats at 150bpm, 5 beats at 156bpm.   [de-identified] : Echo 8/2024 LVEF 65%, moderate MR, mild TR, mild LVH, PASP 35mmHg. Grade 2 diastolic dysfunction. Echo 8/2021 LVEF 50%. +WMA (basal inferior wall hypokinesis, mid inferior wall hypokinesis, mild diastolic dysfunction. LAE. Mild-Mod MR.   [de-identified] : 8/15/24 CTA Calcium Score n/a due to stent. Significant coronary calcification, obstructive disease of RCA, mLAD.  9/7/2021 CTA Calcium score 566- Left Main 0, , Ramus intermedius 10, LCx 222, RCA 12. Suspected luminal stenoses of greater than 70% in the left circumflex and right coronary arteries.  [de-identified] : 8/22/24 severe diffuse atherosclerotic CAD, 30% eccentric distal LM, small caliber diffusely diseased LAD without focal stenosis, 20% oLCx. pRCA  with L -> R collateral opacification appreciated. LVEDP 15mmHg.  [de-identified] : 9/5/24 Carotid US: mild calcified plaque of b/l bulbs. Bilateral ICAs are tortuous, antegrade flow b/l.  7/9/24 US Duplex Venous b/l LE: No acute DVT or venous insufficiency noted bilaterally.

## 2024-09-13 NOTE — PHYSICAL EXAM
[Well Developed] : well developed [No Acute Distress] : no acute distress [Obese] : obese [Normal S1, S2] : normal S1, S2 [No Murmur] : no murmur [Clear Lung Fields] : clear lung fields [Good Air Entry] : good air entry [Normal Gait] : normal gait [Normal DP B/L] : normal DP B/L [Moves all extremities] : moves all extremities [No Focal Deficits] : no focal deficits [Normal Speech] : normal speech [Alert and Oriented] : alert and oriented [Normal memory] : normal memory [de-identified] : bradycardic [de-identified] : trace b/l LE edema.  [de-identified] : easy bruising of LLE.

## 2024-10-09 LAB — HBA1C MFR BLD HPLC: 6.3

## 2024-10-10 ENCOUNTER — NON-APPOINTMENT (OUTPATIENT)
Age: 69
End: 2024-10-10

## 2024-10-10 ENCOUNTER — APPOINTMENT (OUTPATIENT)
Dept: CARDIOLOGY | Facility: CLINIC | Age: 69
End: 2024-10-10
Payer: COMMERCIAL

## 2024-10-10 ENCOUNTER — OUTPATIENT (OUTPATIENT)
Dept: OUTPATIENT SERVICES | Facility: HOSPITAL | Age: 69
LOS: 1 days | End: 2024-10-10
Payer: COMMERCIAL

## 2024-10-10 VITALS
HEART RATE: 60 BPM | OXYGEN SATURATION: 95 % | SYSTOLIC BLOOD PRESSURE: 120 MMHG | DIASTOLIC BLOOD PRESSURE: 64 MMHG | WEIGHT: 256 LBS | HEIGHT: 67 IN | BODY MASS INDEX: 40.18 KG/M2

## 2024-10-10 DIAGNOSIS — G47.33 OBSTRUCTIVE SLEEP APNEA (ADULT) (PEDIATRIC): ICD-10-CM

## 2024-10-10 DIAGNOSIS — I31.9 DISEASE OF PERICARDIUM, UNSPECIFIED: ICD-10-CM

## 2024-10-10 DIAGNOSIS — R07.89 OTHER CHEST PAIN: ICD-10-CM

## 2024-10-10 DIAGNOSIS — Z99.89 DEPENDENCE ON OTHER ENABLING MACHINES AND DEVICES: ICD-10-CM

## 2024-10-10 DIAGNOSIS — G47.30 SLEEP APNEA, UNSPECIFIED: ICD-10-CM

## 2024-10-10 DIAGNOSIS — E66.9 OBESITY, UNSPECIFIED: ICD-10-CM

## 2024-10-10 DIAGNOSIS — E78.5 HYPERLIPIDEMIA, UNSPECIFIED: ICD-10-CM

## 2024-10-10 PROCEDURE — 99215 OFFICE O/P EST HI 40 MIN: CPT

## 2024-10-10 PROCEDURE — 95810 POLYSOM 6/> YRS 4/> PARAM: CPT | Mod: 26

## 2024-10-10 PROCEDURE — 95811 POLYSOM 6/>YRS CPAP 4/> PARM: CPT

## 2024-10-10 PROCEDURE — G2211 COMPLEX E/M VISIT ADD ON: CPT | Mod: NC

## 2024-10-10 RX ORDER — CYCLOBENZAPRINE HYDROCHLORIDE 10 MG/1
10 TABLET, FILM COATED ORAL AT BEDTIME
Refills: 0 | Status: ACTIVE | COMMUNITY

## 2024-10-10 RX ORDER — BACILLUS COAGULANS/INULIN 1B-250 MG
CAPSULE ORAL DAILY
Refills: 0 | Status: ACTIVE | COMMUNITY

## 2024-10-14 ENCOUNTER — APPOINTMENT (OUTPATIENT)
Dept: CARDIOLOGY | Facility: CLINIC | Age: 69
End: 2024-10-14
Payer: COMMERCIAL

## 2024-10-14 ENCOUNTER — RX RENEWAL (OUTPATIENT)
Age: 69
End: 2024-10-14

## 2024-10-14 ENCOUNTER — NON-APPOINTMENT (OUTPATIENT)
Age: 69
End: 2024-10-14

## 2024-10-14 VITALS
BODY MASS INDEX: 40.02 KG/M2 | SYSTOLIC BLOOD PRESSURE: 122 MMHG | DIASTOLIC BLOOD PRESSURE: 50 MMHG | HEIGHT: 67 IN | WEIGHT: 255 LBS | OXYGEN SATURATION: 98 % | HEART RATE: 71 BPM

## 2024-10-14 PROBLEM — I50.30 (HFPEF) HEART FAILURE WITH PRESERVED EJECTION FRACTION: Status: ACTIVE | Noted: 2024-10-14

## 2024-10-14 PROCEDURE — 93000 ELECTROCARDIOGRAM COMPLETE: CPT

## 2024-10-14 PROCEDURE — 99205 OFFICE O/P NEW HI 60 MIN: CPT | Mod: 25

## 2024-10-23 ENCOUNTER — NON-APPOINTMENT (OUTPATIENT)
Age: 69
End: 2024-10-23

## 2024-10-23 ENCOUNTER — APPOINTMENT (OUTPATIENT)
Dept: CARDIOLOGY | Facility: CLINIC | Age: 69
End: 2024-10-23

## 2024-10-23 ENCOUNTER — APPOINTMENT (OUTPATIENT)
Dept: CARDIOLOGY | Facility: CLINIC | Age: 69
End: 2024-10-23
Payer: COMMERCIAL

## 2024-10-23 VITALS — DIASTOLIC BLOOD PRESSURE: 52 MMHG | OXYGEN SATURATION: 95 % | SYSTOLIC BLOOD PRESSURE: 130 MMHG | HEART RATE: 81 BPM

## 2024-10-23 VITALS
HEIGHT: 67 IN | WEIGHT: 262 LBS | SYSTOLIC BLOOD PRESSURE: 140 MMHG | DIASTOLIC BLOOD PRESSURE: 62 MMHG | OXYGEN SATURATION: 97 % | BODY MASS INDEX: 41.12 KG/M2 | HEART RATE: 59 BPM

## 2024-10-23 DIAGNOSIS — E66.9 OBESITY, UNSPECIFIED: ICD-10-CM

## 2024-10-23 DIAGNOSIS — I10 ESSENTIAL (PRIMARY) HYPERTENSION: ICD-10-CM

## 2024-10-23 DIAGNOSIS — R07.89 OTHER CHEST PAIN: ICD-10-CM

## 2024-10-23 DIAGNOSIS — E78.5 HYPERLIPIDEMIA, UNSPECIFIED: ICD-10-CM

## 2024-10-23 DIAGNOSIS — R60.0 LOCALIZED EDEMA: ICD-10-CM

## 2024-10-23 DIAGNOSIS — I44.7 LEFT BUNDLE-BRANCH BLOCK, UNSPECIFIED: ICD-10-CM

## 2024-10-23 DIAGNOSIS — Z99.89 DEPENDENCE ON OTHER ENABLING MACHINES AND DEVICES: ICD-10-CM

## 2024-10-23 PROCEDURE — G2211 COMPLEX E/M VISIT ADD ON: CPT | Mod: NC

## 2024-10-23 PROCEDURE — 93000 ELECTROCARDIOGRAM COMPLETE: CPT

## 2024-10-23 PROCEDURE — 99215 OFFICE O/P EST HI 40 MIN: CPT

## 2024-10-23 RX ORDER — AMLODIPINE BESYLATE 2.5 MG/1
2.5 TABLET ORAL DAILY
Qty: 90 | Refills: 1 | Status: DISCONTINUED | COMMUNITY
Start: 2024-10-14 | End: 2024-10-23

## 2024-10-23 RX ORDER — EMPAGLIFLOZIN 10 MG/1
10 TABLET, FILM COATED ORAL DAILY
Qty: 90 | Refills: 1 | Status: ACTIVE | COMMUNITY
Start: 1900-01-01 | End: 1900-01-01

## 2024-10-23 RX ORDER — SPIRONOLACTONE 25 MG/1
25 TABLET ORAL DAILY
Qty: 180 | Refills: 1 | Status: ACTIVE | COMMUNITY
Start: 2024-10-23 | End: 1900-01-01

## 2024-10-30 ENCOUNTER — APPOINTMENT (OUTPATIENT)
Dept: CARDIOLOGY | Facility: CLINIC | Age: 69
End: 2024-10-30

## 2024-10-31 ENCOUNTER — APPOINTMENT (OUTPATIENT)
Dept: PULMONOLOGY | Facility: CLINIC | Age: 69
End: 2024-10-31
Payer: COMMERCIAL

## 2024-10-31 DIAGNOSIS — I31.9 DISEASE OF PERICARDIUM, UNSPECIFIED: ICD-10-CM

## 2024-10-31 DIAGNOSIS — G47.30 SLEEP APNEA, UNSPECIFIED: ICD-10-CM

## 2024-10-31 DIAGNOSIS — E66.01 MORBID (SEVERE) OBESITY DUE TO EXCESS CALORIES: ICD-10-CM

## 2024-10-31 DIAGNOSIS — I25.10 ATHEROSCLEROTIC HEART DISEASE OF NATIVE CORONARY ARTERY W/OUT ANGINA PECTORIS: ICD-10-CM

## 2024-10-31 DIAGNOSIS — I50.30 UNSPECIFIED DIASTOLIC (CONGESTIVE) HEART FAILURE: ICD-10-CM

## 2024-10-31 PROCEDURE — 99214 OFFICE O/P EST MOD 30 MIN: CPT

## 2024-10-31 PROCEDURE — G2211 COMPLEX E/M VISIT ADD ON: CPT | Mod: NC

## 2024-11-27 ENCOUNTER — APPOINTMENT (OUTPATIENT)
Dept: MRI IMAGING | Facility: CLINIC | Age: 69
End: 2024-11-27
Payer: COMMERCIAL

## 2024-11-27 ENCOUNTER — RESULT REVIEW (OUTPATIENT)
Age: 69
End: 2024-11-27

## 2024-11-27 PROCEDURE — 75561 CARDIAC MRI FOR MORPH W/DYE: CPT

## 2024-11-27 PROCEDURE — A9585: CPT | Mod: JW

## 2024-11-27 PROCEDURE — 75565 CARD MRI VELOC FLOW MAPPING: CPT

## 2024-11-29 ENCOUNTER — APPOINTMENT (OUTPATIENT)
Dept: CARDIOLOGY | Facility: CLINIC | Age: 69
End: 2024-11-29
Payer: COMMERCIAL

## 2024-11-29 ENCOUNTER — NON-APPOINTMENT (OUTPATIENT)
Age: 69
End: 2024-11-29

## 2024-11-29 VITALS
DIASTOLIC BLOOD PRESSURE: 50 MMHG | SYSTOLIC BLOOD PRESSURE: 128 MMHG | OXYGEN SATURATION: 97 % | BODY MASS INDEX: 46.93 KG/M2 | HEART RATE: 56 BPM | HEIGHT: 62 IN | WEIGHT: 255 LBS

## 2024-11-29 DIAGNOSIS — I50.30 UNSPECIFIED DIASTOLIC (CONGESTIVE) HEART FAILURE: ICD-10-CM

## 2024-11-29 DIAGNOSIS — I25.10 ATHEROSCLEROTIC HEART DISEASE OF NATIVE CORONARY ARTERY W/OUT ANGINA PECTORIS: ICD-10-CM

## 2024-11-29 DIAGNOSIS — I31.9 DISEASE OF PERICARDIUM, UNSPECIFIED: ICD-10-CM

## 2024-11-29 PROCEDURE — 99215 OFFICE O/P EST HI 40 MIN: CPT

## 2024-11-29 PROCEDURE — G2211 COMPLEX E/M VISIT ADD ON: CPT | Mod: NC

## 2024-11-29 PROCEDURE — 93000 ELECTROCARDIOGRAM COMPLETE: CPT

## 2024-11-29 RX ORDER — ATENOLOL 25 MG/1
25 TABLET ORAL DAILY
Qty: 90 | Refills: 1 | Status: ACTIVE | COMMUNITY
Start: 1900-01-01 | End: 1900-01-01

## 2024-11-29 RX ORDER — GABAPENTIN 600 MG/1
600 TABLET, COATED ORAL 3 TIMES DAILY
Refills: 0 | Status: ACTIVE | COMMUNITY

## 2024-12-13 ENCOUNTER — NON-APPOINTMENT (OUTPATIENT)
Age: 69
End: 2024-12-13

## 2024-12-18 ENCOUNTER — APPOINTMENT (OUTPATIENT)
Dept: RHEUMATOLOGY | Facility: CLINIC | Age: 69
End: 2024-12-18
Payer: COMMERCIAL

## 2024-12-18 VITALS — HEART RATE: 57 BPM | OXYGEN SATURATION: 98 % | DIASTOLIC BLOOD PRESSURE: 67 MMHG | SYSTOLIC BLOOD PRESSURE: 116 MMHG

## 2024-12-18 DIAGNOSIS — G89.4 CHRONIC PAIN SYNDROME: ICD-10-CM

## 2024-12-18 DIAGNOSIS — M25.50 PAIN IN UNSPECIFIED JOINT: ICD-10-CM

## 2024-12-18 PROCEDURE — 99205 OFFICE O/P NEW HI 60 MIN: CPT

## 2024-12-20 ENCOUNTER — OUTPATIENT (OUTPATIENT)
Dept: INPATIENT UNIT | Facility: HOSPITAL | Age: 69
LOS: 1 days | Discharge: HOME | End: 2024-12-20
Payer: COMMERCIAL

## 2024-12-20 VITALS
DIASTOLIC BLOOD PRESSURE: 68 MMHG | OXYGEN SATURATION: 95 % | HEART RATE: 68 BPM | TEMPERATURE: 98 F | RESPIRATION RATE: 13 BRPM | SYSTOLIC BLOOD PRESSURE: 126 MMHG

## 2024-12-20 VITALS
TEMPERATURE: 98 F | HEART RATE: 46 BPM | DIASTOLIC BLOOD PRESSURE: 58 MMHG | RESPIRATION RATE: 16 BRPM | OXYGEN SATURATION: 97 % | WEIGHT: 255.07 LBS | HEIGHT: 67 IN | SYSTOLIC BLOOD PRESSURE: 137 MMHG

## 2024-12-20 DIAGNOSIS — Z98.890 OTHER SPECIFIED POSTPROCEDURAL STATES: Chronic | ICD-10-CM

## 2024-12-20 DIAGNOSIS — M75.102 UNSPECIFIED ROTATOR CUFF TEAR OR RUPTURE OF LEFT SHOULDER, NOT SPECIFIED AS TRAUMATIC: ICD-10-CM

## 2024-12-20 DIAGNOSIS — I25.10 ATHEROSCLEROTIC HEART DISEASE OF NATIVE CORONARY ARTERY WITHOUT ANGINA PECTORIS: ICD-10-CM

## 2024-12-20 DIAGNOSIS — Z79.84 LONG TERM (CURRENT) USE OF ORAL HYPOGLYCEMIC DRUGS: ICD-10-CM

## 2024-12-20 DIAGNOSIS — M19.012 PRIMARY OSTEOARTHRITIS, LEFT SHOULDER: ICD-10-CM

## 2024-12-20 DIAGNOSIS — M75.42 IMPINGEMENT SYNDROME OF LEFT SHOULDER: ICD-10-CM

## 2024-12-20 DIAGNOSIS — E78.5 HYPERLIPIDEMIA, UNSPECIFIED: ICD-10-CM

## 2024-12-20 DIAGNOSIS — M25.812 OTHER SPECIFIED JOINT DISORDERS, LEFT SHOULDER: ICD-10-CM

## 2024-12-20 DIAGNOSIS — Z90.79 ACQUIRED ABSENCE OF OTHER GENITAL ORGAN(S): Chronic | ICD-10-CM

## 2024-12-20 DIAGNOSIS — Z95.5 PRESENCE OF CORONARY ANGIOPLASTY IMPLANT AND GRAFT: Chronic | ICD-10-CM

## 2024-12-20 DIAGNOSIS — I50.9 HEART FAILURE, UNSPECIFIED: ICD-10-CM

## 2024-12-20 DIAGNOSIS — I11.0 HYPERTENSIVE HEART DISEASE WITH HEART FAILURE: ICD-10-CM

## 2024-12-20 DIAGNOSIS — Z99.89 DEPENDENCE ON OTHER ENABLING MACHINES AND DEVICES: ICD-10-CM

## 2024-12-20 DIAGNOSIS — M94.212 CHONDROMALACIA, LEFT SHOULDER: ICD-10-CM

## 2024-12-20 DIAGNOSIS — R73.03 PREDIABETES: ICD-10-CM

## 2024-12-20 DIAGNOSIS — Z95.5 PRESENCE OF CORONARY ANGIOPLASTY IMPLANT AND GRAFT: ICD-10-CM

## 2024-12-20 DIAGNOSIS — Z96.659 PRESENCE OF UNSPECIFIED ARTIFICIAL KNEE JOINT: Chronic | ICD-10-CM

## 2024-12-20 DIAGNOSIS — Z79.02 LONG TERM (CURRENT) USE OF ANTITHROMBOTICS/ANTIPLATELETS: ICD-10-CM

## 2024-12-20 DIAGNOSIS — G47.33 OBSTRUCTIVE SLEEP APNEA (ADULT) (PEDIATRIC): ICD-10-CM

## 2024-12-20 DIAGNOSIS — E66.01 MORBID (SEVERE) OBESITY DUE TO EXCESS CALORIES: ICD-10-CM

## 2024-12-20 PROCEDURE — C1713: CPT

## 2024-12-20 PROCEDURE — C9399: CPT

## 2024-12-20 PROCEDURE — 88304 TISSUE EXAM BY PATHOLOGIST: CPT | Mod: 26

## 2024-12-20 PROCEDURE — 88304 TISSUE EXAM BY PATHOLOGIST: CPT

## 2024-12-20 RX ORDER — DOXAZOSIN MESYLATE 1 MG
1 TABLET ORAL
Refills: 0 | DISCHARGE

## 2024-12-20 RX ORDER — OXYCODONE AND ACETAMINOPHEN 5; 325 MG/1; MG/1
1 TABLET ORAL
Refills: 0 | DISCHARGE

## 2024-12-20 RX ORDER — ONDANSETRON HYDROCHLORIDE 4 MG/1
4 TABLET, FILM COATED ORAL ONCE
Refills: 0 | Status: COMPLETED | OUTPATIENT
Start: 2024-12-20 | End: 2024-12-20

## 2024-12-20 RX ORDER — ROSUVASTATIN CALCIUM 5 MG/1
1 TABLET, FILM COATED ORAL
Refills: 0 | DISCHARGE

## 2024-12-20 RX ORDER — FENTANYL 12 UG/H
50 PATCH, EXTENDED RELEASE TRANSDERMAL
Refills: 0 | Status: DISCONTINUED | OUTPATIENT
Start: 2024-12-20 | End: 2024-12-20

## 2024-12-20 RX ORDER — CYCLOBENZAPRINE HCL 10 MG
1 TABLET ORAL
Refills: 0 | DISCHARGE

## 2024-12-20 RX ORDER — ONDANSETRON HYDROCHLORIDE 4 MG/1
1 TABLET, FILM COATED ORAL
Qty: 21 | Refills: 0
Start: 2024-12-20 | End: 2024-12-26

## 2024-12-20 RX ORDER — ACETAMINOPHEN, DIPHENHYDRAMINE HCL, PHENYLEPHRINE HCL 325; 25; 5 MG/1; MG/1; MG/1
1 TABLET ORAL
Refills: 0 | DISCHARGE

## 2024-12-20 RX ORDER — ATENOLOL 100 MG/1
1 TABLET ORAL
Refills: 0 | DISCHARGE

## 2024-12-20 RX ORDER — EMPAGLIFLOZIN 25 MG/1
1 TABLET, FILM COATED ORAL
Refills: 0 | DISCHARGE

## 2024-12-20 RX ORDER — TELMISARTAN AND HYDROCHLORTHIAZIDE 40; 12.5 MG/1; MG/1
1 TABLET ORAL
Refills: 0 | DISCHARGE

## 2024-12-20 RX ORDER — OXYCODONE AND ACETAMINOPHEN 5; 325 MG/1; MG/1
1 TABLET ORAL
Qty: 28 | Refills: 0
Start: 2024-12-20 | End: 2024-12-26

## 2024-12-20 RX ORDER — NITROGLYCERIN 0.4MG/HR
1 PATCH, TRANSDERMAL 24 HOURS TRANSDERMAL
Refills: 0 | DISCHARGE

## 2024-12-20 RX ORDER — OXYCODONE HYDROCHLORIDE 30 MG/1
5 TABLET ORAL ONCE
Refills: 0 | Status: DISCONTINUED | OUTPATIENT
Start: 2024-12-20 | End: 2024-12-20

## 2024-12-20 RX ORDER — AMLODIPINE BESYLATE 10 MG/1
1 TABLET ORAL
Refills: 0 | DISCHARGE

## 2024-12-20 RX ORDER — KETOCONAZOLE 20 MG/G
1 CREAM TOPICAL
Refills: 0 | DISCHARGE

## 2024-12-20 RX ORDER — DULOXETINE HCL 60 MG
1 CAPSULE,DELAYED RELEASE (ENTERIC COATED) ORAL
Refills: 0 | DISCHARGE

## 2024-12-20 RX ORDER — ACETAMINOPHEN 500MG 500 MG/1
2 TABLET, COATED ORAL
Refills: 0 | DISCHARGE

## 2024-12-20 RX ORDER — HYDROMORPHONE HYDROCHLORIDE 2 MG/1
0.5 TABLET ORAL
Refills: 0 | Status: DISCONTINUED | OUTPATIENT
Start: 2024-12-20 | End: 2024-12-20

## 2024-12-20 RX ORDER — PANTOPRAZOLE SODIUM 40 MG/1
1 TABLET, DELAYED RELEASE ORAL
Refills: 0 | DISCHARGE

## 2024-12-20 RX ORDER — DOCUSATE SODIUM 100 MG
1 CAPSULE ORAL
Qty: 14 | Refills: 0
Start: 2024-12-20 | End: 2024-12-26

## 2024-12-20 RX ORDER — CHOLECALCIFEROL (VITAMIN D3) 10MCG/0.25
1 DROPS ORAL
Refills: 0 | DISCHARGE

## 2024-12-20 RX ORDER — GABAPENTIN 300 MG/1
1 CAPSULE ORAL
Refills: 0 | DISCHARGE

## 2024-12-20 RX ADMIN — ONDANSETRON HYDROCHLORIDE 4 MILLIGRAM(S): 4 TABLET, FILM COATED ORAL at 18:38

## 2024-12-20 NOTE — ASU DISCHARGE PLAN (ADULT/PEDIATRIC) - PAIN MANAGEMENT
Explained side effects of medications including dependence and sedation with Lorazepam.  Advised NOT to drive or operate machinery after taking Lorazepam.     Prescription called to pharmacy

## 2024-12-20 NOTE — BRIEF OPERATIVE NOTE - NSICDXBRIEFPROCEDURE_GEN_ALL_CORE_FT
PROCEDURES:  Repair, rotator cuff, arthroscopic 20-Dec-2024 18:25:33 subacromial decompression, distal clavicle excision Beau Guerrero

## 2024-12-20 NOTE — ASU DISCHARGE PLAN (ADULT/PEDIATRIC) - NS MD DC FALL RISK RISK
For information on Fall & Injury Prevention, visit: [email or web address] OR  [email or web address] OR  https://www.cdc.gov/steadi/patient.html

## 2024-12-20 NOTE — ASU PATIENT PROFILE, ADULT - NSICDXPASTMEDICALHX_GEN_ALL_CORE_FT
PAST MEDICAL HISTORY:  Arthralgia of back     Bladder outlet obstruction     CAD (coronary artery disease) with inomplete LBBB 1st degree AV block    Enlarged prostate     H/O arthroscopy of left knee     HTN (hypertension)     Obesity     Prediabetes     SCC (squamous cell carcinoma)

## 2024-12-20 NOTE — ASU DISCHARGE PLAN (ADULT/PEDIATRIC) - ASU DC SPECIAL INSTRUCTIONSFT
Shoulder Arthroscopy Instructions (and/or Arthroscopic Rotator Cuff Repair)    1) Your Shoulder will swell over the next 48hours and you can expect pain to get a bit worse. Your hand may swell also. Ice your shoulder plenty, continuously if possible. Fill up a plastic bag with ice, then wrap it in a towel or pillow case.     2) ACTIVITY: Elevate your hand and wiggle your fingers often (10x per hour). Active movement of you fingers/wrist/elbow is encouraged to reduce stiffness. NO LIFTING ANYTHING with the arm. Be up and walking as much as you can tolerate. The more you move the better you will do.     3) BANDAGE: Expect some mild bloody drainage. It is normal. It may soak through the gauze and ACE bandage. This is mostly leftover Saline fluid coming out from surgery.     4) SHOWER: Remove bandage in 48hrs and place a Waterproof Band Aide over each of the incisions. You can shower in 48 hours. No bath. Pat your incisions dry. No creams, no lotions.    5) Only reason to worry would be if pain got so severe that you cannot feel or wiggle your fingers. In this case you need to call or come to the ER. But as long as you can feel and wiggle your fingers, you are fine.    6) Call the office to schedule a follow up appointment in 10-14 days. Your Sutures will be removed at that point.    7) A pain Rx is in the chart; fill it on your way home. OR was sent electronically to your pharmacy, pick it up on the way home. Shoulder Arthroscopy Instructions (and/or Arthroscopic Rotator Cuff Repair)    1) Your Shoulder will swell over the next 48hours and you can expect pain to get a bit worse. Your hand may swell also. Ice your shoulder plenty, continuously if possible. Fill up a plastic bag with ice, then wrap it in a towel or pillow case.     2) ACTIVITY: Begin Pendulum exercises on post operative day one. DO NOT LIFT ANYTHING HEAVY OR MOV ARM FORWARD.    3) BANDAGE: Expect some mild bloody drainage. It is normal. It may soak through the gauze and ACE bandage. This is mostly leftover Saline fluid coming out from surgery.     4) SHOWER: Remove bandage in 48hrs and place a Waterproof Band Aide over each of the incisions. You can shower in 48 hours. No bath. Pat your incisions dry. No creams, no lotions.    5) Only reason to worry would be if pain got so severe that you cannot feel or wiggle your fingers. In this case you need to call or come to the ER. But as long as you can feel and wiggle your fingers, you are fine.    6) Call the office to schedule a follow up appointment in 7 days. Your Sutures will be removed at that point.    7) A pain Rx is in the chart; fill it on your way home. OR was sent electronically to your pharmacy, pick it up on the way home.

## 2024-12-20 NOTE — ASU DISCHARGE PLAN (ADULT/PEDIATRIC) - FINANCIAL ASSISTANCE
[facility] provides services at a reduced cost to those who are determined to be eligible through [facility]’s financial assistance program. Information regarding [facility]’s financial assistance program can be found by going to [email or web address] or by calling [phone].

## 2024-12-20 NOTE — ASU DISCHARGE PLAN (ADULT/PEDIATRIC) - PROCEDURE
left shoulder arthroscopy, subacromial decompression, distal clavicle resection, rotator cuff repair

## 2024-12-20 NOTE — ASU PATIENT PROFILE, ADULT - NSICDXPASTSURGICALHX_GEN_ALL_CORE_FT
PAST SURGICAL HISTORY:  History of back surgery     History of carpal tunnel release     History of hernia repair     S/P coronary artery stent placement left circumflex    S/P total knee replacement bilateral    S/P TURP (status post transurethral resection of prostate)

## 2024-12-26 ENCOUNTER — APPOINTMENT (OUTPATIENT)
Dept: CARDIOLOGY | Facility: CLINIC | Age: 69
End: 2024-12-26
Payer: COMMERCIAL

## 2024-12-26 VITALS
BODY MASS INDEX: 40.02 KG/M2 | SYSTOLIC BLOOD PRESSURE: 120 MMHG | HEIGHT: 67 IN | OXYGEN SATURATION: 98 % | HEART RATE: 64 BPM | RESPIRATION RATE: 14 BRPM | WEIGHT: 255 LBS | DIASTOLIC BLOOD PRESSURE: 52 MMHG

## 2024-12-26 DIAGNOSIS — F41.9 ANXIETY DISORDER, UNSPECIFIED: ICD-10-CM

## 2024-12-26 DIAGNOSIS — I25.10 ATHEROSCLEROTIC HEART DISEASE OF NATIVE CORONARY ARTERY W/OUT ANGINA PECTORIS: ICD-10-CM

## 2024-12-26 DIAGNOSIS — I50.30 UNSPECIFIED DIASTOLIC (CONGESTIVE) HEART FAILURE: ICD-10-CM

## 2024-12-26 DIAGNOSIS — I31.9 DISEASE OF PERICARDIUM, UNSPECIFIED: ICD-10-CM

## 2024-12-26 DIAGNOSIS — I44.7 LEFT BUNDLE-BRANCH BLOCK, UNSPECIFIED: ICD-10-CM

## 2024-12-26 DIAGNOSIS — R07.89 OTHER CHEST PAIN: ICD-10-CM

## 2024-12-26 DIAGNOSIS — E66.01 MORBID (SEVERE) OBESITY DUE TO EXCESS CALORIES: ICD-10-CM

## 2024-12-26 DIAGNOSIS — I10 ESSENTIAL (PRIMARY) HYPERTENSION: ICD-10-CM

## 2024-12-26 DIAGNOSIS — E78.5 HYPERLIPIDEMIA, UNSPECIFIED: ICD-10-CM

## 2024-12-26 DIAGNOSIS — R60.0 LOCALIZED EDEMA: ICD-10-CM

## 2024-12-26 DIAGNOSIS — I87.2 VENOUS INSUFFICIENCY (CHRONIC) (PERIPHERAL): ICD-10-CM

## 2024-12-26 DIAGNOSIS — Z99.89 DEPENDENCE ON OTHER ENABLING MACHINES AND DEVICES: ICD-10-CM

## 2024-12-26 DIAGNOSIS — G47.30 SLEEP APNEA, UNSPECIFIED: ICD-10-CM

## 2024-12-26 PROCEDURE — G2211 COMPLEX E/M VISIT ADD ON: CPT | Mod: NC

## 2024-12-26 PROCEDURE — 99215 OFFICE O/P EST HI 40 MIN: CPT

## 2024-12-26 RX ORDER — SACUBITRIL AND VALSARTAN 49; 51 MG/1; MG/1
49-51 TABLET, FILM COATED ORAL TWICE DAILY
Qty: 180 | Refills: 1 | Status: ACTIVE | COMMUNITY
Start: 2024-12-26 | End: 1900-01-01

## 2024-12-27 LAB — SURGICAL PATHOLOGY STUDY: SIGNIFICANT CHANGE UP

## 2024-12-28 DIAGNOSIS — I50.30 UNSPECIFIED DIASTOLIC (CONGESTIVE) HEART FAILURE: ICD-10-CM

## 2024-12-31 PROBLEM — E66.9 OBESITY, UNSPECIFIED: Chronic | Status: ACTIVE | Noted: 2024-12-19

## 2024-12-31 PROBLEM — R73.03 PREDIABETES: Chronic | Status: ACTIVE | Noted: 2024-12-19

## 2024-12-31 PROBLEM — C44.92 SQUAMOUS CELL CARCINOMA OF SKIN, UNSPECIFIED: Chronic | Status: ACTIVE | Noted: 2024-12-19

## 2024-12-31 PROBLEM — N40.0 BENIGN PROSTATIC HYPERPLASIA WITHOUT LOWER URINARY TRACT SYMPTOMS: Chronic | Status: ACTIVE | Noted: 2024-12-19

## 2024-12-31 PROBLEM — N32.0 BLADDER-NECK OBSTRUCTION: Chronic | Status: ACTIVE | Noted: 2024-12-19

## 2024-12-31 PROBLEM — I25.10 ATHEROSCLEROTIC HEART DISEASE OF NATIVE CORONARY ARTERY WITHOUT ANGINA PECTORIS: Chronic | Status: ACTIVE | Noted: 2024-12-19

## 2024-12-31 PROBLEM — Z98.890 OTHER SPECIFIED POSTPROCEDURAL STATES: Chronic | Status: ACTIVE | Noted: 2024-12-19

## 2024-12-31 PROBLEM — I10 ESSENTIAL (PRIMARY) HYPERTENSION: Chronic | Status: ACTIVE | Noted: 2024-12-19

## 2025-01-22 ENCOUNTER — APPOINTMENT (OUTPATIENT)
Dept: PULMONOLOGY | Facility: CLINIC | Age: 70
End: 2025-01-22

## 2025-01-23 RX ORDER — TELMISARTAN 40 MG/1
40 TABLET ORAL
Qty: 90 | Refills: 3 | Status: ACTIVE | COMMUNITY
Start: 1900-01-01 | End: 1900-01-01

## 2025-01-29 ENCOUNTER — NON-APPOINTMENT (OUTPATIENT)
Age: 70
End: 2025-01-29

## 2025-01-29 ENCOUNTER — APPOINTMENT (OUTPATIENT)
Dept: CARDIOLOGY | Facility: CLINIC | Age: 70
End: 2025-01-29
Payer: COMMERCIAL

## 2025-01-29 VITALS
WEIGHT: 270 LBS | SYSTOLIC BLOOD PRESSURE: 124 MMHG | BODY MASS INDEX: 42.38 KG/M2 | OXYGEN SATURATION: 97 % | HEART RATE: 79 BPM | HEIGHT: 67 IN | DIASTOLIC BLOOD PRESSURE: 60 MMHG

## 2025-01-29 DIAGNOSIS — I50.30 UNSPECIFIED DIASTOLIC (CONGESTIVE) HEART FAILURE: ICD-10-CM

## 2025-01-29 DIAGNOSIS — Z01.818 ENCOUNTER FOR OTHER PREPROCEDURAL EXAMINATION: ICD-10-CM

## 2025-01-29 DIAGNOSIS — I10 ESSENTIAL (PRIMARY) HYPERTENSION: ICD-10-CM

## 2025-01-29 DIAGNOSIS — E78.5 HYPERLIPIDEMIA, UNSPECIFIED: ICD-10-CM

## 2025-01-29 DIAGNOSIS — I25.10 ATHEROSCLEROTIC HEART DISEASE OF NATIVE CORONARY ARTERY W/OUT ANGINA PECTORIS: ICD-10-CM

## 2025-01-29 PROCEDURE — 99215 OFFICE O/P EST HI 40 MIN: CPT

## 2025-01-29 PROCEDURE — G2211 COMPLEX E/M VISIT ADD ON: CPT | Mod: NC

## 2025-01-30 PROBLEM — Z01.818 PRE-OP EVALUATION: Status: ACTIVE | Noted: 2025-01-30

## 2025-02-05 RX ORDER — SACUBITRIL AND VALSARTAN 49; 51 MG/1; MG/1
49-51 TABLET, FILM COATED ORAL
Refills: 0 | Status: ACTIVE | COMMUNITY

## 2025-03-06 ENCOUNTER — NON-APPOINTMENT (OUTPATIENT)
Age: 70
End: 2025-03-06

## 2025-04-09 ENCOUNTER — APPOINTMENT (OUTPATIENT)
Dept: VASCULAR SURGERY | Facility: CLINIC | Age: 70
End: 2025-04-09
Payer: COMMERCIAL

## 2025-04-09 VITALS
OXYGEN SATURATION: 97 % | HEART RATE: 64 BPM | BODY MASS INDEX: 42.38 KG/M2 | HEIGHT: 67 IN | DIASTOLIC BLOOD PRESSURE: 66 MMHG | SYSTOLIC BLOOD PRESSURE: 124 MMHG | WEIGHT: 270 LBS

## 2025-04-09 DIAGNOSIS — M94.0 CHONDROCOSTAL JUNCTION SYNDROME [TIETZE]: ICD-10-CM

## 2025-04-09 PROCEDURE — 99213 OFFICE O/P EST LOW 20 MIN: CPT

## 2025-04-09 RX ORDER — ACETAMINOPHEN 500 MG
500 TABLET ORAL
Refills: 0 | Status: ACTIVE | COMMUNITY

## 2025-04-21 ENCOUNTER — APPOINTMENT (OUTPATIENT)
Dept: CARDIOLOGY | Facility: CLINIC | Age: 70
End: 2025-04-21
Payer: COMMERCIAL

## 2025-04-21 PROCEDURE — 93306 TTE W/DOPPLER COMPLETE: CPT

## 2025-04-21 PROCEDURE — 96374 THER/PROPH/DIAG INJ IV PUSH: CPT | Mod: 59,JZ

## 2025-04-23 ENCOUNTER — NON-APPOINTMENT (OUTPATIENT)
Age: 70
End: 2025-04-23

## 2025-04-23 ENCOUNTER — APPOINTMENT (OUTPATIENT)
Dept: CARDIOLOGY | Facility: CLINIC | Age: 70
End: 2025-04-23
Payer: COMMERCIAL

## 2025-04-23 VITALS
OXYGEN SATURATION: 96 % | DIASTOLIC BLOOD PRESSURE: 54 MMHG | HEART RATE: 47 BPM | HEIGHT: 67 IN | WEIGHT: 260 LBS | SYSTOLIC BLOOD PRESSURE: 120 MMHG | BODY MASS INDEX: 40.81 KG/M2

## 2025-04-23 DIAGNOSIS — I20.89 OTHER FORMS OF ANGINA PECTORIS: ICD-10-CM

## 2025-04-23 PROCEDURE — 99215 OFFICE O/P EST HI 40 MIN: CPT

## 2025-04-23 PROCEDURE — G2211 COMPLEX E/M VISIT ADD ON: CPT | Mod: NC

## 2025-04-23 RX ORDER — METHYLCELLULOSE 2 G/10.2G
POWDER, FOR SOLUTION ORAL
Refills: 0 | Status: ACTIVE | COMMUNITY

## 2025-06-16 LAB — A1CG - A1C WITH ESTIMATED AVERAGE GLUCOSE: 6

## 2025-06-19 ENCOUNTER — APPOINTMENT (OUTPATIENT)
Dept: CARDIOLOGY | Facility: CLINIC | Age: 70
End: 2025-06-19
Payer: COMMERCIAL

## 2025-06-19 VITALS
RESPIRATION RATE: 14 BRPM | OXYGEN SATURATION: 95 % | WEIGHT: 242 LBS | DIASTOLIC BLOOD PRESSURE: 60 MMHG | HEART RATE: 58 BPM | SYSTOLIC BLOOD PRESSURE: 110 MMHG | BODY MASS INDEX: 37.98 KG/M2 | HEIGHT: 67 IN

## 2025-06-19 PROCEDURE — 99215 OFFICE O/P EST HI 40 MIN: CPT

## 2025-06-19 PROCEDURE — G2211 COMPLEX E/M VISIT ADD ON: CPT | Mod: NC

## 2025-06-19 RX ORDER — GABAPENTIN 600 MG/1
600 TABLET, COATED ORAL EVERY MORNING
Refills: 0 | Status: ACTIVE | COMMUNITY

## 2025-06-19 RX ORDER — TIRZEPATIDE 2.5 MG/.5ML
2.5 INJECTION, SOLUTION SUBCUTANEOUS WEEKLY
Refills: 0 | Status: ACTIVE | COMMUNITY

## 2025-06-19 RX ORDER — ATENOLOL 25 MG/1
25 TABLET ORAL DAILY
Refills: 0 | Status: ACTIVE | COMMUNITY

## 2025-07-29 ENCOUNTER — NON-APPOINTMENT (OUTPATIENT)
Age: 70
End: 2025-07-29

## 2025-07-30 RX ORDER — SEMAGLUTIDE 0.25 MG/.5ML
0.25 INJECTION, SOLUTION SUBCUTANEOUS
Refills: 0 | Status: ACTIVE | COMMUNITY

## 2025-08-25 ENCOUNTER — APPOINTMENT (OUTPATIENT)
Dept: CARDIOLOGY | Facility: CLINIC | Age: 70
End: 2025-08-25
Payer: COMMERCIAL

## 2025-08-25 PROCEDURE — 96374 THER/PROPH/DIAG INJ IV PUSH: CPT | Mod: 59,JZ

## 2025-08-25 PROCEDURE — 93306 TTE W/DOPPLER COMPLETE: CPT

## 2025-08-27 ENCOUNTER — APPOINTMENT (OUTPATIENT)
Dept: CARDIOLOGY | Facility: CLINIC | Age: 70
End: 2025-08-27
Payer: COMMERCIAL

## 2025-08-27 ENCOUNTER — NON-APPOINTMENT (OUTPATIENT)
Age: 70
End: 2025-08-27

## 2025-08-27 VITALS
HEART RATE: 74 BPM | WEIGHT: 234 LBS | BODY MASS INDEX: 36.73 KG/M2 | OXYGEN SATURATION: 96 % | HEIGHT: 67 IN | DIASTOLIC BLOOD PRESSURE: 42 MMHG | SYSTOLIC BLOOD PRESSURE: 108 MMHG

## 2025-08-27 DIAGNOSIS — I50.30 UNSPECIFIED DIASTOLIC (CONGESTIVE) HEART FAILURE: ICD-10-CM

## 2025-08-27 DIAGNOSIS — I25.10 ATHEROSCLEROTIC HEART DISEASE OF NATIVE CORONARY ARTERY W/OUT ANGINA PECTORIS: ICD-10-CM

## 2025-08-27 PROCEDURE — 99215 OFFICE O/P EST HI 40 MIN: CPT

## 2025-08-27 PROCEDURE — 93000 ELECTROCARDIOGRAM COMPLETE: CPT

## 2025-08-27 PROCEDURE — G2211 COMPLEX E/M VISIT ADD ON: CPT | Mod: NC

## 2025-09-02 ENCOUNTER — RX CHANGE (OUTPATIENT)
Age: 70
End: 2025-09-02